# Patient Record
Sex: FEMALE | Race: BLACK OR AFRICAN AMERICAN | Employment: UNEMPLOYED | URBAN - METROPOLITAN AREA
[De-identification: names, ages, dates, MRNs, and addresses within clinical notes are randomized per-mention and may not be internally consistent; named-entity substitution may affect disease eponyms.]

---

## 2018-03-14 ENCOUNTER — TELEPHONE (OUTPATIENT)
Dept: FAMILY MEDICINE CLINIC | Facility: CLINIC | Age: 33
End: 2018-03-14

## 2018-03-14 NOTE — TELEPHONE ENCOUNTER
PT WOULD LIKE TO SEE IF DR HERNANDEZ WOULD ACCEPT PT AS NEW PT.    PT SEE'S DAUGHTER MARYANA DOWLING.     PT HAS MEDICAID INS-LOOKING TO ESTABLISH CARE    ADV  OUT OF OFFICE UNTIL TOMORROW    THANK YOU

## 2018-03-15 NOTE — TELEPHONE ENCOUNTER
Spoke to mom about making appt with KE, she seemed agreeable.  Came in with daughter and she said she will make appt when she leaves

## 2018-03-15 NOTE — TELEPHONE ENCOUNTER
Yes I would take her since I see her dtr, but please still offer her Dr. Rufus Gold or Dr. Ly Clubs, let her know my practice is so full it can be a wait to get in sometimes. ..

## 2019-04-09 ENCOUNTER — OFFICE VISIT (OUTPATIENT)
Dept: FAMILY MEDICINE CLINIC | Facility: CLINIC | Age: 34
End: 2019-04-09
Payer: MEDICAID

## 2019-04-09 VITALS
HEART RATE: 76 BPM | RESPIRATION RATE: 12 BRPM | DIASTOLIC BLOOD PRESSURE: 80 MMHG | TEMPERATURE: 99 F | BODY MASS INDEX: 25.03 KG/M2 | SYSTOLIC BLOOD PRESSURE: 120 MMHG | WEIGHT: 136 LBS | HEIGHT: 62 IN

## 2019-04-09 DIAGNOSIS — Z12.39 SCREENING BREAST EXAMINATION: ICD-10-CM

## 2019-04-09 DIAGNOSIS — R55 VASOVAGAL SYNCOPE: ICD-10-CM

## 2019-04-09 DIAGNOSIS — Z23 NEED FOR VACCINATION: ICD-10-CM

## 2019-04-09 DIAGNOSIS — R10.2 PELVIC PAIN: ICD-10-CM

## 2019-04-09 DIAGNOSIS — K42.9 UMBILICAL HERNIA WITHOUT OBSTRUCTION AND WITHOUT GANGRENE: ICD-10-CM

## 2019-04-09 DIAGNOSIS — M54.50 ACUTE LEFT-SIDED LOW BACK PAIN WITHOUT SCIATICA: ICD-10-CM

## 2019-04-09 DIAGNOSIS — Z12.4 CERVICAL CANCER SCREENING: ICD-10-CM

## 2019-04-09 DIAGNOSIS — Z00.00 ROUTINE HISTORY AND PHYSICAL EXAMINATION OF ADULT: Primary | ICD-10-CM

## 2019-04-09 DIAGNOSIS — Z87.42 HISTORY OF ABNORMAL CERVICAL PAP SMEAR: ICD-10-CM

## 2019-04-09 DIAGNOSIS — Z11.3 ROUTINE SCREENING FOR STI (SEXUALLY TRANSMITTED INFECTION): ICD-10-CM

## 2019-04-09 PROCEDURE — 87491 CHLMYD TRACH DNA AMP PROBE: CPT | Performed by: FAMILY MEDICINE

## 2019-04-09 PROCEDURE — 87624 HPV HI-RISK TYP POOLED RSLT: CPT | Performed by: FAMILY MEDICINE

## 2019-04-09 PROCEDURE — 88175 CYTOPATH C/V AUTO FLUID REDO: CPT | Performed by: FAMILY MEDICINE

## 2019-04-09 PROCEDURE — 90686 IIV4 VACC NO PRSV 0.5 ML IM: CPT | Performed by: FAMILY MEDICINE

## 2019-04-09 PROCEDURE — 87625 HPV TYPES 16 & 18 ONLY: CPT | Performed by: FAMILY MEDICINE

## 2019-04-09 PROCEDURE — 87591 N.GONORRHOEAE DNA AMP PROB: CPT | Performed by: FAMILY MEDICINE

## 2019-04-09 PROCEDURE — 90471 IMMUNIZATION ADMIN: CPT | Performed by: FAMILY MEDICINE

## 2019-04-09 PROCEDURE — 99385 PREV VISIT NEW AGE 18-39: CPT | Performed by: FAMILY MEDICINE

## 2019-04-15 ENCOUNTER — TELEPHONE (OUTPATIENT)
Dept: FAMILY MEDICINE CLINIC | Facility: CLINIC | Age: 34
End: 2019-04-15

## 2019-04-15 NOTE — TELEPHONE ENCOUNTER
----- Message from Nolan Walsh MD sent at 4/14/2019  8:54 PM CDT -----  Please notify patient PAP is mildly abnormal, the mildest of abnormalities, called ASCUS. It is due to a very common infection we call HPV.     HPV is very very common, up to 80% of

## 2019-04-18 NOTE — TELEPHONE ENCOUNTER
Patient stopped by office because she doesn't have time to talk on the phone to our office. Read patient results from Dr Rosemarie Staley. Patient verbalized understanding.    Advised patient that if she wants to schedule colposcopy [facility]

## 2019-04-29 ENCOUNTER — HOSPITAL ENCOUNTER (OUTPATIENT)
Dept: ULTRASOUND IMAGING | Age: 34
Discharge: HOME | End: 2019-04-29
Attending: FAMILY MEDICINE
Payer: MEDICAID

## 2019-04-29 DIAGNOSIS — R10.2 PELVIC PAIN: ICD-10-CM

## 2019-04-29 PROCEDURE — 76856 US EXAM PELVIC COMPLETE: CPT | Performed by: FAMILY MEDICINE

## 2019-04-29 PROCEDURE — 76830 TRANSVAGINAL US NON-OB: CPT | Performed by: FAMILY MEDICINE

## 2019-04-30 ENCOUNTER — TELEPHONE (OUTPATIENT)
Dept: FAMILY MEDICINE CLINIC | Facility: CLINIC | Age: 34
End: 2019-04-30

## 2019-04-30 NOTE — TELEPHONE ENCOUNTER
Patient advised. Verbalizes understanding. [id number] staff to call patient back to schedule with Dr. Ziggy Irwin.

## 2019-04-30 NOTE — TELEPHONE ENCOUNTER
----- Message from Marta Singh MD sent at 4/30/2019 11:52 AM CDT -----  Please notify patient of great news, pelvic u/s completely normal, no abnormalities of uterus nor ovaries.   I rec she f/u with Dr. England nine [phone] to see if he sees any sign

## 2019-06-06 ENCOUNTER — TELEPHONE (OUTPATIENT)
Dept: FAMILY MEDICINE CLINIC | Facility: CLINIC | Age: 34
End: 2019-06-06

## 2019-06-06 NOTE — TELEPHONE ENCOUNTER
PT STOPPED IN TODAY AND ADV THAT SHE WOULD LIKE A DIABETIC BLOOD TEST DONE. SHE ADV THAT SHE PASSES OUT. PT HAS A FUTURE APT ON 6/19 AND WOULD LIKE TO GET BLOOD TEST DONE ON THAT DAY. WOULD THANK BE OK?     Onelia Ortiz

## 2019-06-06 NOTE — TELEPHONE ENCOUNTER
Need more information. How often does this happen. Any triggering factors. ..stress, exercise, fasting?   May need ER evaluation    Left message on voicemail/answering machine for patient to call office

## 2019-06-07 NOTE — TELEPHONE ENCOUNTER
So these symptoms last occurred a little while ago, can wait until 6/19 to do testing (labs, possibly cardiac testing)

## 2019-06-07 NOTE — TELEPHONE ENCOUNTER
Spoke with the pt       Happened 3 times in her life- can not pinpoint what could be causing it    1st time she was in her  early 18's    2nd time was last year and 3rd time was early this year    3rd time was when she was at work- early this year    The s

## 2019-06-07 NOTE — TELEPHONE ENCOUNTER
Left message for the pt ok to wait til OV for testing- advised that if she has any episodes before then to please call the office

## 2019-06-07 NOTE — TELEPHONE ENCOUNTER
Message   Received:  Today   Message Contents   RUTH Jiménez Nurse   Caller: PT (Today, 10:03 AM)             PT RETURNED CALL, PLEASE CALL WHEN AVAILABLE     THANK YOU     PT GOES TO WORK AT 1PM, [address] [employer]

## 2019-06-19 ENCOUNTER — OFFICE VISIT (OUTPATIENT)
Dept: FAMILY MEDICINE CLINIC | Facility: CLINIC | Age: 34
End: 2019-06-19
Payer: MEDICAID

## 2019-06-19 VITALS
WEIGHT: 147.38 LBS | TEMPERATURE: 98 F | BODY MASS INDEX: 27.12 KG/M2 | HEIGHT: 62 IN | DIASTOLIC BLOOD PRESSURE: 80 MMHG | OXYGEN SATURATION: 98 % | HEART RATE: 88 BPM | SYSTOLIC BLOOD PRESSURE: 114 MMHG | RESPIRATION RATE: 14 BRPM

## 2019-06-19 DIAGNOSIS — R87.810 ASCUS WITH POSITIVE HIGH RISK HPV CERVICAL: ICD-10-CM

## 2019-06-19 DIAGNOSIS — Z01.818 PRE-PROCEDURAL EXAMINATION: Primary | ICD-10-CM

## 2019-06-19 DIAGNOSIS — K62.89 RECTAL PAIN: ICD-10-CM

## 2019-06-19 DIAGNOSIS — R87.610 ASCUS WITH POSITIVE HIGH RISK HPV CERVICAL: ICD-10-CM

## 2019-06-19 PROCEDURE — 81025 URINE PREGNANCY TEST: CPT | Performed by: FAMILY MEDICINE

## 2019-06-19 PROCEDURE — 57456 ENDOCERV CURETTAGE W/SCOPE: CPT | Performed by: FAMILY MEDICINE

## 2019-06-19 PROCEDURE — 88305 TISSUE EXAM BY PATHOLOGIST: CPT | Performed by: FAMILY MEDICINE

## 2019-06-19 PROCEDURE — 99212 OFFICE O/P EST SF 10 MIN: CPT | Performed by: FAMILY MEDICINE

## 2019-06-19 NOTE — PROGRESS NOTES
Rubia Louise is a 28year old female. HPI:   Pt is here for a colposcopy.      Indication: ASCUS PAP, HR HPV + (highest types neg)  Prior colposcopy: yes, no surgery ever (last one several years ago during last pregnancy)  Pregnancy test: negative  Iodine reproduced with digital exam when pressing posteriorly; rec f/u with Dr. Rachel Bars for closer look, patient agreeable, took his business card  The patient indicates understanding of these issues and agrees to the plan.

## 2019-06-24 ENCOUNTER — TELEPHONE (OUTPATIENT)
Dept: FAMILY MEDICINE CLINIC | Facility: CLINIC | Age: 34
End: 2019-06-24

## 2019-06-24 NOTE — TELEPHONE ENCOUNTER
----- Message from Marta Singh MD sent at 6/24/2019  6:52 AM CDT -----  Please notify patient the pathologist said they could see enough cells to get an accurate reading on the biopsy.   Our options are to repeat the test now (colposcopy), or repeat PAP i

## 2019-06-25 NOTE — TELEPHONE ENCOUNTER
Left detailed message for the pt with the notes from Dr. Juan J Bolanos.  Asked her to call back to let me know what she would like to do- advised that I will either place a reminder for next year or schedule her to repeat the test.

## 2019-07-10 NOTE — TELEPHONE ENCOUNTER
Left message for the pt to call back, I need to know what she would like to do- wait til next year or repeat the colpo

## 2019-07-16 NOTE — TELEPHONE ENCOUNTER
Mailed a letter to the pt    Recall entered for 1 year so that she has a reminder if she doesn't return for the repeat colpo

## 2019-07-25 ENCOUNTER — MED REC SCAN ONLY (OUTPATIENT)
Dept: FAMILY MEDICINE CLINIC | Facility: CLINIC | Age: 34
End: 2019-07-25

## 2019-08-21 NOTE — TELEPHONE ENCOUNTER
Was on the phone with the spouse and he passed the phone to her- I explained to her about the colpo and offered her to repeat pap in a year or repeat the colpo- she was to redo the colpo- scheduled an appt  Future Appointments   Date Time Provider Departme

## 2019-09-04 ENCOUNTER — HOSPITAL ENCOUNTER (OUTPATIENT)
Age: 34
Discharge: HOME | End: 2019-09-04
Attending: FAMILY MEDICINE
Payer: MEDICAID

## 2019-09-04 VITALS
DIASTOLIC BLOOD PRESSURE: 80 MMHG | TEMPERATURE: 99 F | SYSTOLIC BLOOD PRESSURE: 130 MMHG | HEART RATE: 70 BPM | OXYGEN SATURATION: 98 % | RESPIRATION RATE: 16 BRPM

## 2019-09-04 DIAGNOSIS — R05.9 COUGH: Primary | ICD-10-CM

## 2019-09-04 PROCEDURE — 99214 OFFICE O/P EST MOD 30 MIN: CPT

## 2019-09-04 PROCEDURE — 99213 OFFICE O/P EST LOW 20 MIN: CPT

## 2019-09-04 RX ORDER — BENZONATATE 200 MG/1
200 CAPSULE ORAL 3 TIMES DAILY PRN
Qty: 15 CAPSULE | Refills: 0 | Status: SHIPPED | OUTPATIENT
Start: 2019-09-04 | End: 2020-09-21 | Stop reason: ALTCHOICE

## 2019-09-04 RX ORDER — PREDNISONE 20 MG/1
40 TABLET ORAL DAILY
Qty: 10 TABLET | Refills: 0 | Status: SHIPPED | OUTPATIENT
Start: 2019-09-04 | End: 2019-09-09

## 2019-09-04 RX ORDER — ALBUTEROL SULFATE 90 UG/1
2 AEROSOL, METERED RESPIRATORY (INHALATION) EVERY 6 HOURS PRN
Qty: 1 INHALER | Refills: 0 | Status: SHIPPED | OUTPATIENT
Start: 2019-09-04 | End: 2019-10-04

## 2019-09-04 NOTE — ED INITIAL ASSESSMENT (HPI)
Julianna Donaldume a house yesterday, cough started when she walked into the house, she was also painting the house, cough increased overnight, no fevers.

## 2019-09-04 NOTE — ED PROVIDER NOTES
Patient Seen in: [address] [facility]    History   Patient presents with:  Cough/URI    Stated Complaint: cough/chest congestion/exposed to mold    HPI    **25-year-old female presents to the immediate care today with a chief complaint of nonp normal  Neck: no adenopathy, supple, no bruits  Lungs: Decreased air entry bilaterally. . No rhonchi, wheezing  or crackles. No accessory muscle use. No respiratory distress. No tachypnea noted. No retractions noted.    Heart: S1, S2 normal, no murmur, clic

## 2019-09-16 ENCOUNTER — MED REC SCAN ONLY (OUTPATIENT)
Dept: FAMILY MEDICINE CLINIC | Facility: CLINIC | Age: 34
End: 2019-09-16

## 2020-09-21 ENCOUNTER — OFFICE VISIT (OUTPATIENT)
Dept: FAMILY MEDICINE CLINIC | Facility: CLINIC | Age: 35
End: 2020-09-21
Payer: MEDICAID

## 2020-09-21 VITALS
TEMPERATURE: 98 F | RESPIRATION RATE: 20 BRPM | WEIGHT: 145 LBS | OXYGEN SATURATION: 99 % | DIASTOLIC BLOOD PRESSURE: 58 MMHG | HEART RATE: 84 BPM | HEIGHT: 62 IN | SYSTOLIC BLOOD PRESSURE: 104 MMHG | BODY MASS INDEX: 26.68 KG/M2

## 2020-09-21 DIAGNOSIS — M54.2 NECK PAIN: ICD-10-CM
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
## 2020-09-21 DIAGNOSIS — Z23 NEED FOR VACCINATION: ICD-10-CM

## 2020-09-21 DIAGNOSIS — Z09 FOLLOW-UP EXAM: Primary | ICD-10-CM

## 2020-09-21 DIAGNOSIS — R51.9 PERSISTENT HEADACHES: ICD-10-CM

## 2020-09-21 PROCEDURE — 90686 IIV4 VACC NO PRSV 0.5 ML IM: CPT | Performed by: FAMILY MEDICINE

## 2020-09-21 PROCEDURE — 90471 IMMUNIZATION ADMIN: CPT | Performed by: FAMILY MEDICINE

## 2020-09-21 PROCEDURE — 3074F SYST BP LT 130 MM HG: CPT | Performed by: FAMILY MEDICINE

## 2020-09-21 PROCEDURE — 99214 OFFICE O/P EST MOD 30 MIN: CPT | Performed by: FAMILY MEDICINE

## 2020-09-21 PROCEDURE — 3078F DIAST BP <80 MM HG: CPT | Performed by: FAMILY MEDICINE

## 2020-09-21 PROCEDURE — 3008F BODY MASS INDEX DOCD: CPT | Performed by: FAMILY MEDICINE
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
## 2020-09-29 ENCOUNTER — OFFICE VISIT (OUTPATIENT)
Dept: FAMILY MEDICINE CLINIC | Facility: CLINIC | Age: 35
End: 2020-09-29
Payer: MEDICAID

## 2020-09-29 ENCOUNTER — LABORATORY ENCOUNTER (OUTPATIENT)
Dept: LAB | Age: 35
End: 2020-09-29
Attending: FAMILY MEDICINE
Payer: MEDICAID

## 2020-09-29 VITALS
TEMPERATURE: 97 F | RESPIRATION RATE: 18 BRPM | OXYGEN SATURATION: 94 % | WEIGHT: 143 LBS | SYSTOLIC BLOOD PRESSURE: 128 MMHG | BODY MASS INDEX: 25.66 KG/M2 | HEIGHT: 62.5 IN | HEART RATE: 82 BPM | DIASTOLIC BLOOD PRESSURE: 84 MMHG

## 2020-09-29 DIAGNOSIS — M54.2 NECK PAIN: ICD-10-CM

## 2020-09-29 DIAGNOSIS — Z13.29 THYROID DISORDER SCREEN: ICD-10-CM

## 2020-09-29 DIAGNOSIS — Z13.220 LIPID SCREENING: ICD-10-CM

## 2020-09-29 DIAGNOSIS — M62.838 NECK MUSCLE SPASM: ICD-10-CM

## 2020-09-29 DIAGNOSIS — Z13.1 DIABETES MELLITUS SCREENING: ICD-10-CM

## 2020-09-29 DIAGNOSIS — Z11.3 ROUTINE SCREENING FOR STI (SEXUALLY TRANSMITTED INFECTION): ICD-10-CM

## 2020-09-29 DIAGNOSIS — Z12.4 CERVICAL CANCER SCREENING: Primary | ICD-10-CM

## 2020-09-29 DIAGNOSIS — Z13.0 SCREENING, ANEMIA, DEFICIENCY, IRON: ICD-10-CM

## 2020-09-29 PROCEDURE — 87591 N.GONORRHOEAE DNA AMP PROB: CPT | Performed by: FAMILY MEDICINE

## 2020-09-29 PROCEDURE — 87625 HPV TYPES 16 & 18 ONLY: CPT | Performed by: FAMILY MEDICINE

## 2020-09-29 PROCEDURE — 3079F DIAST BP 80-89 MM HG: CPT | Performed by: FAMILY MEDICINE

## 2020-09-29 PROCEDURE — 3074F SYST BP LT 130 MM HG: CPT | Performed by: FAMILY MEDICINE

## 2020-09-29 PROCEDURE — 80053 COMPREHEN METABOLIC PANEL: CPT

## 2020-09-29 PROCEDURE — 87491 CHLMYD TRACH DNA AMP PROBE: CPT | Performed by: FAMILY MEDICINE

## 2020-09-29 PROCEDURE — 87389 HIV-1 AG W/HIV-1&-2 AB AG IA: CPT

## 2020-09-29 PROCEDURE — 3008F BODY MASS INDEX DOCD: CPT | Performed by: FAMILY MEDICINE

## 2020-09-29 PROCEDURE — 88175 CYTOPATH C/V AUTO FLUID REDO: CPT | Performed by: FAMILY MEDICINE

## 2020-09-29 PROCEDURE — 83036 HEMOGLOBIN GLYCOSYLATED A1C: CPT

## 2020-09-29 PROCEDURE — 86780 TREPONEMA PALLIDUM: CPT

## 2020-09-29 PROCEDURE — 87624 HPV HI-RISK TYP POOLED RSLT: CPT | Performed by: FAMILY MEDICINE

## 2020-09-29 PROCEDURE — 80061 LIPID PANEL: CPT

## 2020-09-29 PROCEDURE — 86803 HEPATITIS C AB TEST: CPT

## 2020-09-29 PROCEDURE — 36415 COLL VENOUS BLD VENIPUNCTURE: CPT

## 2020-09-29 PROCEDURE — 99395 PREV VISIT EST AGE 18-39: CPT | Performed by: FAMILY MEDICINE

## 2020-09-29 PROCEDURE — 84443 ASSAY THYROID STIM HORMONE: CPT

## 2020-09-29 PROCEDURE — 85025 COMPLETE CBC W/AUTO DIFF WBC: CPT

## 2020-09-29 RX ORDER — CYCLOBENZAPRINE HCL 10 MG
10 TABLET ORAL 2 TIMES DAILY
COMMUNITY
Start: 2020-09-21 | End: 2020-09-29

## 2020-09-29 RX ORDER — CYCLOBENZAPRINE HCL 10 MG
10 TABLET ORAL 2 TIMES DAILY PRN
Qty: 28 TABLET | Refills: 0 | Status: SHIPPED | OUTPATIENT
Start: 2020-09-29 | End: 2020-10-13

## 2020-09-29 NOTE — PROGRESS NOTES
HPI:   Ansley Cintron is a 27year old female who presents for her PAP to f/u on prior ascus. She has no gyne concners. Regular periods once a month, not too heavy, last a week. She's interested in general checkup blood work and STI screen.       She is f nourished,in no apparent distress  SKIN: no rashes,no suspicious lesions  MUSC: + significant muscle spasm and tenderness of R paracervical, upper back and SCM region compared to left  LUNGS: clear to auscultation  CARDIO: RRR without murmur  :introitus Future  -     HCV ANTIBODY; Future  -     HIV AG AB COMBO; Future  -     T PALLIDUM SCREENING CASCADE; Future  -     CBC WITH DIFFERENTIAL WITH PLATELET; Future  -     COMP METABOLIC PANEL (14); Future  -     HEMOGLOBIN A1C; Future  -     LIPID PANEL;  Futu

## 2020-09-30 ENCOUNTER — APPOINTMENT (OUTPATIENT)
Dept: GENERAL RADIOLOGY | Facility: HOSPITAL | Age: 35
End: 2020-09-30
Attending: PHYSICIAN ASSISTANT
Payer: MEDICAID

## 2020-09-30 ENCOUNTER — HOSPITAL ENCOUNTER (EMERGENCY)
Facility: HOSPITAL | Age: 35
Discharge: HOME | End: 2020-09-30
Attending: PHYSICIAN ASSISTANT
Payer: MEDICAID

## 2020-09-30 VITALS
TEMPERATURE: 98 F | BODY MASS INDEX: 26.68 KG/M2 | RESPIRATION RATE: 18 BRPM | OXYGEN SATURATION: 98 % | DIASTOLIC BLOOD PRESSURE: 59 MMHG | SYSTOLIC BLOOD PRESSURE: 102 MMHG | HEIGHT: 62 IN | HEART RATE: 74 BPM | WEIGHT: 145 LBS

## 2020-09-30 DIAGNOSIS — S86.911A KNEE STRAIN, RIGHT, INITIAL ENCOUNTER: Primary | ICD-10-CM

## 2020-09-30 LAB
C TRACH DNA SPEC QL NAA+PROBE: NEGATIVE
N GONORRHOEA DNA SPEC QL NAA+PROBE: NEGATIVE

## 2020-09-30 PROCEDURE — 99284 EMERGENCY DEPT VISIT MOD MDM: CPT

## 2020-09-30 PROCEDURE — 73560 X-RAY EXAM OF KNEE 1 OR 2: CPT | Performed by: PHYSICIAN ASSISTANT

## 2020-09-30 RX ORDER — IBUPROFEN 600 MG/1
TABLET ORAL
Qty: 20 TABLET | Refills: 0 | Status: SHIPPED | OUTPATIENT
Start: 2020-09-30 | End: 2021-08-13

## 2020-09-30 RX ORDER — HYDROCODONE BITARTRATE AND ACETAMINOPHEN 5; 325 MG/1; MG/1
1 TABLET ORAL ONCE
Status: COMPLETED | OUTPATIENT
Start: 2020-09-30 | End: 2020-09-30

## 2020-09-30 RX ORDER — HYDROCODONE BITARTRATE AND ACETAMINOPHEN 5; 325 MG/1; MG/1
1 TABLET ORAL EVERY 6 HOURS PRN
Qty: 8 TABLET | Refills: 0 | Status: SHIPPED | OUTPATIENT
Start: 2020-09-30 | End: 2020-10-07

## 2020-09-30 RX ORDER — IBUPROFEN 600 MG/1
600 TABLET ORAL ONCE
Status: COMPLETED | OUTPATIENT
Start: 2020-09-30 | End: 2020-09-30

## 2020-10-01 NOTE — ED PROVIDER NOTES
Patient Seen in: [facility] Emergency Department    History   Patient presents with:  Fall    Stated Complaint: fall    HPI    HPI: Kaylen Lozano is a 27year old female who presents with chief complaint of right knee pain. Onset yesterday.   Marleny Resp 18   Temp 98.3 °F (36.8 °C)   Temp src Oral   SpO2 97 %   O2 Device None (Room air)       Current:/59   Pulse 74   Temp 98.3 °F (36.8 °C) (Oral)   Resp 18   Ht 157.5 cm (5' 2\")   Wt 65.8 kg   LMP 09/15/2020   SpO2 98%   BMI 26.52 kg/m² obvious rash.     Differential diagnosis to include fracture vs. Strain/sprain vs. contusion      ED Course   Labs Reviewed - No data to display    MDM     Radiology findings: Xr Knee (1 Or 2 Views), Right (cpt=73560)    Result Date: 9/30/2020  CONCLUSION:

## 2020-10-05 ENCOUNTER — TELEPHONE (OUTPATIENT)
Dept: FAMILY MEDICINE CLINIC | Facility: CLINIC | Age: 35
End: 2020-10-05

## 2020-10-05 DIAGNOSIS — D72.819 LEUKOPENIA, UNSPECIFIED TYPE: Primary | ICD-10-CM

## 2020-10-05 NOTE — TELEPHONE ENCOUNTER
----- Message from Che Fernandez MD sent at 10/4/2020  7:54 PM CDT -----  Please notify labs look great for most part: neg STI (hiv, syphillis, hep C). Normal kidneys, liver, electrolytes, thyroid, cholesterol.   Only mild abnormality is white blood cell c

## 2020-10-14 ENCOUNTER — TELEPHONE (OUTPATIENT)
Dept: OBGYN CLINIC | Facility: CLINIC | Age: 35
End: 2020-10-14

## 2020-10-14 ENCOUNTER — TELEPHONE (OUTPATIENT)
Dept: FAMILY MEDICINE CLINIC | Facility: CLINIC | Age: 35
End: 2020-10-14

## 2020-10-14 NOTE — TELEPHONE ENCOUNTER
PT CALLED SHE WAS UNABLE TO MAKE AN APPOINTMENT WITH UROLOGY AS THE NEED THE REFERRAL FAXED TO THEM AND THEN THEY CALL HER.  SHE STATES THE SHE IS GOING TO [place]

## 2020-10-14 NOTE — TELEPHONE ENCOUNTER
Tried to call the pt to find out who she is going to see and why so I can place a referral and fax it.  No answer and mailbox is full - pt will need a call back

## 2020-10-14 NOTE — TELEPHONE ENCOUNTER
Pt was referred by PCP for abnormal Pap. Result in system. Please review and determine how to schedule appt.

## 2020-10-21 NOTE — TELEPHONE ENCOUNTER
Spoke with the pt and asked about urology and she states that this is for her spouse Kaushal Kidney- updated his chart    She states that she was not able to make the appt with OB- they were looking for additional information    Advised the pt that I see in her c

## 2020-10-21 NOTE — TELEPHONE ENCOUNTER
Patient returned call. Informed of recommendations. She has had colpo in the past.  Advised of no intercourse 24 hours before and can take Ibuprofen 600 MG 30-60 minutes before the procedure. Call transferred to [facility] to schedule.

## 2020-11-19 ENCOUNTER — TELEPHONE (OUTPATIENT)
Dept: FAMILY MEDICINE CLINIC | Facility: CLINIC | Age: 35
End: 2020-11-19

## 2020-11-19 NOTE — TELEPHONE ENCOUNTER
Letter mailed to patient reminding her she is due for labs.     Lab Frequency Next Occurrence   CBC WITH DIFFERENTIAL WITH PLATELET Once 07/44/3658

## 2020-11-24 ENCOUNTER — OFFICE VISIT (OUTPATIENT)
Dept: OBGYN CLINIC | Facility: CLINIC | Age: 35
End: 2020-11-24
Payer: MEDICAID

## 2020-11-24 VITALS
BODY MASS INDEX: 27.56 KG/M2 | HEIGHT: 61 IN | DIASTOLIC BLOOD PRESSURE: 74 MMHG | SYSTOLIC BLOOD PRESSURE: 120 MMHG | WEIGHT: 146 LBS

## 2020-11-24 DIAGNOSIS — R87.611 PAP SMEAR OF CERVIX WITH ASCUS, CANNOT EXCLUDE HGSIL: ICD-10-CM

## 2020-11-24 DIAGNOSIS — Z32.02 PREGNANCY EXAMINATION OR TEST, NEGATIVE RESULT: Primary | ICD-10-CM

## 2020-11-24 PROCEDURE — 3078F DIAST BP <80 MM HG: CPT | Performed by: OBSTETRICS & GYNECOLOGY

## 2020-11-24 PROCEDURE — 88342 IMHCHEM/IMCYTCHM 1ST ANTB: CPT | Performed by: OBSTETRICS & GYNECOLOGY

## 2020-11-24 PROCEDURE — 81025 URINE PREGNANCY TEST: CPT | Performed by: OBSTETRICS & GYNECOLOGY

## 2020-11-24 PROCEDURE — 3008F BODY MASS INDEX DOCD: CPT | Performed by: OBSTETRICS & GYNECOLOGY

## 2020-11-24 PROCEDURE — 57454 BX/CURETT OF CERVIX W/SCOPE: CPT | Performed by: OBSTETRICS & GYNECOLOGY

## 2020-11-24 PROCEDURE — 88305 TISSUE EXAM BY PATHOLOGIST: CPT | Performed by: OBSTETRICS & GYNECOLOGY

## 2020-11-24 PROCEDURE — 3074F SYST BP LT 130 MM HG: CPT | Performed by: OBSTETRICS & GYNECOLOGY

## 2020-11-24 NOTE — PATIENT INSTRUCTIONS
[facility] Department of OB/GYN  After Care Instructions for Colposcopy/Biopsy      Biopsy Results   You will receive a phone call with your biopsy results in 7 business days. If you have not received your biopsy results in 7 days, please contact our office.   Geoff hSort
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
## 2020-12-03 ENCOUNTER — TELEPHONE (OUTPATIENT)
Dept: OBGYN CLINIC | Facility: CLINIC | Age: 35
End: 2020-12-03

## 2020-12-07 ENCOUNTER — OFFICE VISIT (OUTPATIENT)
Dept: OBGYN CLINIC | Facility: CLINIC | Age: 35
End: 2020-12-07
Payer: MEDICAID

## 2020-12-07 VITALS
DIASTOLIC BLOOD PRESSURE: 62 MMHG | SYSTOLIC BLOOD PRESSURE: 104 MMHG | BODY MASS INDEX: 27.38 KG/M2 | WEIGHT: 145 LBS | HEIGHT: 61 IN

## 2020-12-07 DIAGNOSIS — N87.9 DYSPLASIA OF CERVIX: Primary | ICD-10-CM

## 2020-12-07 PROCEDURE — 3074F SYST BP LT 130 MM HG: CPT | Performed by: OBSTETRICS & GYNECOLOGY

## 2020-12-07 PROCEDURE — 3078F DIAST BP <80 MM HG: CPT | Performed by: OBSTETRICS & GYNECOLOGY

## 2020-12-07 PROCEDURE — 3008F BODY MASS INDEX DOCD: CPT | Performed by: OBSTETRICS & GYNECOLOGY

## 2020-12-07 PROCEDURE — 99024 POSTOP FOLLOW-UP VISIT: CPT | Performed by: OBSTETRICS & GYNECOLOGY

## 2020-12-07 NOTE — PROGRESS NOTES
Post Op Colposcopy    No C/O  ROS: No Cardiac, Respiratory, GI,  or Neurological symptoms.     Slight discharge    PE:  Abdomen soft  Cervix well healed    Discussed LUIS 2, positive ECC and ectocervix    Treatment cryo, LEEP, cone biopsy discussed    She

## 2021-01-08 ENCOUNTER — TELEPHONE (OUTPATIENT)
Dept: OBGYN CLINIC | Facility: CLINIC | Age: 36
End: 2021-01-08

## 2021-01-26 ENCOUNTER — TELEPHONE (OUTPATIENT)
Dept: FAMILY MEDICINE CLINIC | Facility: CLINIC | Age: 36
End: 2021-01-26

## 2021-01-26 NOTE — TELEPHONE ENCOUNTER
Signed release of records received from Dr. Alireza Mueller's office OB/Gyn for pt to send pap and cultures from 2020      Faxed to [phone]

## 2021-02-02 NOTE — TELEPHONE ENCOUNTER
Surgery scheduled for 4/6/2021 at 7:30AM  Post op   Future Appointments   Date Time Provider Tania Owens   4/23/2021 10:30 AM Arely Pacheco MD [facility] [address]     Orders faxed to OR  Added to calendar  PA - no auth needed for CPT 92955  Ref

## 2021-04-02 ENCOUNTER — MED REC SCAN ONLY (OUTPATIENT)
Dept: FAMILY MEDICINE CLINIC | Facility: CLINIC | Age: 36
End: 2021-04-02

## 2021-04-03 ENCOUNTER — LAB ENCOUNTER (OUTPATIENT)
Dept: LAB | Age: 36
End: 2021-04-03
Attending: OBSTETRICS & GYNECOLOGY
Payer: MEDICAID

## 2021-04-03 DIAGNOSIS — N87.9 CERVICAL DYSPLASIA: ICD-10-CM

## 2021-04-05 ENCOUNTER — ANESTHESIA EVENT (OUTPATIENT)
Dept: SURGERY | Facility: HOSPITAL | Age: 36
End: 2021-04-05
Payer: MEDICAID

## 2021-04-06 ENCOUNTER — HOSPITAL ENCOUNTER (OUTPATIENT)
Facility: HOSPITAL | Age: 36
Setting detail: HOSPITAL OUTPATIENT SURGERY
Discharge: HOME | End: 2021-04-06
Attending: OBSTETRICS & GYNECOLOGY | Admitting: OBSTETRICS & GYNECOLOGY
Payer: MEDICAID

## 2021-04-06 ENCOUNTER — ANESTHESIA (OUTPATIENT)
Dept: SURGERY | Facility: HOSPITAL | Age: 36
End: 2021-04-06
Payer: MEDICAID

## 2021-04-06 VITALS
HEART RATE: 56 BPM | OXYGEN SATURATION: 97 % | BODY MASS INDEX: 28.32 KG/M2 | RESPIRATION RATE: 18 BRPM | WEIGHT: 150 LBS | TEMPERATURE: 97 F | HEIGHT: 61 IN | DIASTOLIC BLOOD PRESSURE: 73 MMHG | SYSTOLIC BLOOD PRESSURE: 112 MMHG

## 2021-04-06 DIAGNOSIS — N87.9 CERVICAL DYSPLASIA: Primary | ICD-10-CM

## 2021-04-06 PROCEDURE — 0UBC7ZX EXCISION OF CERVIX, VIA NATURAL OR ARTIFICIAL OPENING, DIAGNOSTIC: ICD-10-PCS | Performed by: OBSTETRICS & GYNECOLOGY

## 2021-04-06 PROCEDURE — 57520 CONIZATION OF CERVIX: CPT | Performed by: OBSTETRICS & GYNECOLOGY

## 2021-04-06 RX ORDER — ONDANSETRON 2 MG/ML
INJECTION INTRAMUSCULAR; INTRAVENOUS AS NEEDED
Status: DISCONTINUED | OUTPATIENT
Start: 2021-04-06 | End: 2021-04-06 | Stop reason: SURG

## 2021-04-06 RX ORDER — IODINE SOLUTION STRONG 5% (LUGOL'S) 5 %
SOLUTION ORAL AS NEEDED
Status: DISCONTINUED | OUTPATIENT
Start: 2021-04-06 | End: 2021-04-06 | Stop reason: HOSPADM

## 2021-04-06 RX ORDER — DEXAMETHASONE SODIUM PHOSPHATE 4 MG/ML
VIAL (ML) INJECTION AS NEEDED
Status: DISCONTINUED | OUTPATIENT
Start: 2021-04-06 | End: 2021-04-06 | Stop reason: SURG

## 2021-04-06 RX ORDER — LIDOCAINE HYDROCHLORIDE 10 MG/ML
INJECTION, SOLUTION EPIDURAL; INFILTRATION; INTRACAUDAL; PERINEURAL AS NEEDED
Status: DISCONTINUED | OUTPATIENT
Start: 2021-04-06 | End: 2021-04-06 | Stop reason: SURG

## 2021-04-06 RX ORDER — HYDROCODONE BITARTRATE AND ACETAMINOPHEN 5; 325 MG/1; MG/1
2 TABLET ORAL AS NEEDED
Status: DISCONTINUED | OUTPATIENT
Start: 2021-04-06 | End: 2021-04-06

## 2021-04-06 RX ORDER — SODIUM CHLORIDE, SODIUM LACTATE, POTASSIUM CHLORIDE, CALCIUM CHLORIDE 600; 310; 30; 20 MG/100ML; MG/100ML; MG/100ML; MG/100ML
INJECTION, SOLUTION INTRAVENOUS CONTINUOUS
Status: DISCONTINUED | OUTPATIENT
Start: 2021-04-06 | End: 2021-04-06

## 2021-04-06 RX ORDER — KETOROLAC TROMETHAMINE 30 MG/ML
INJECTION, SOLUTION INTRAMUSCULAR; INTRAVENOUS AS NEEDED
Status: DISCONTINUED | OUTPATIENT
Start: 2021-04-06 | End: 2021-04-06 | Stop reason: SURG

## 2021-04-06 RX ORDER — ACETAMINOPHEN 500 MG
1000 TABLET ORAL EVERY 6 HOURS PRN
COMMUNITY
End: 2021-04-12 | Stop reason: ALTCHOICE

## 2021-04-06 RX ORDER — FERRIC SUBSULFATE 20-22G/100
SOLUTION, NON-ORAL MISCELLANEOUS AS NEEDED
Status: DISCONTINUED | OUTPATIENT
Start: 2021-04-06 | End: 2021-04-06 | Stop reason: HOSPADM

## 2021-04-06 RX ORDER — ACETAMINOPHEN 500 MG
1000 TABLET ORAL ONCE
Status: DISCONTINUED | OUTPATIENT
Start: 2021-04-06 | End: 2021-04-06 | Stop reason: HOSPADM

## 2021-04-06 RX ORDER — HYDROCODONE BITARTRATE AND ACETAMINOPHEN 5; 325 MG/1; MG/1
1 TABLET ORAL AS NEEDED
Status: DISCONTINUED | OUTPATIENT
Start: 2021-04-06 | End: 2021-04-06

## 2021-04-06 RX ORDER — NALOXONE HYDROCHLORIDE 0.4 MG/ML
80 INJECTION, SOLUTION INTRAMUSCULAR; INTRAVENOUS; SUBCUTANEOUS AS NEEDED
Status: DISCONTINUED | OUTPATIENT
Start: 2021-04-06 | End: 2021-04-06

## 2021-04-06 RX ORDER — ONDANSETRON 2 MG/ML
4 INJECTION INTRAMUSCULAR; INTRAVENOUS AS NEEDED
Status: DISCONTINUED | OUTPATIENT
Start: 2021-04-06 | End: 2021-04-06

## 2021-04-06 RX ADMIN — ONDANSETRON 4 MG: 2 INJECTION INTRAMUSCULAR; INTRAVENOUS at 08:07:00

## 2021-04-06 RX ADMIN — LIDOCAINE HYDROCHLORIDE 100 MG: 10 INJECTION, SOLUTION EPIDURAL; INFILTRATION; INTRACAUDAL; PERINEURAL at 07:35:00

## 2021-04-06 RX ADMIN — DEXAMETHASONE SODIUM PHOSPHATE 8 MG: 4 MG/ML VIAL (ML) INJECTION at 07:39:00

## 2021-04-06 RX ADMIN — SODIUM CHLORIDE, SODIUM LACTATE, POTASSIUM CHLORIDE, CALCIUM CHLORIDE: 600; 310; 30; 20 INJECTION, SOLUTION INTRAVENOUS at 08:17:00

## 2021-04-06 RX ADMIN — KETOROLAC TROMETHAMINE 30 MG: 30 INJECTION, SOLUTION INTRAMUSCULAR; INTRAVENOUS at 08:08:00

## 2021-04-06 NOTE — BRIEF OP NOTE
Pre-Operative Diagnosis: CERVICAL DYSPLASIA     Post-Operative Diagnosis: CERVICAL DYSPLASIA      Procedure Performed:   COLD KNIFE CONE BIOPSY CERVIX    Surgeon(s) and Role:     Brit Smith MD - Primary    Assistant(s):        Surgical Findings: Lugol

## 2021-04-06 NOTE — OPERATIVE REPORT
[facility]    PATIENT'S NAME: Pavel James   ATTENDING PHYSICIAN: Juan José Keyes M.D. OPERATING PHYSICIAN: Juan José Keyes M.D.    PATIENT ACCOUNT#:   [de-identified]    LOCATION:  PREOPASCC  PRE ASCC 13 EDWP 10  MEDICAL RECORD #:   [id number]       DATE M.D.  d: 04/06/2021 08:17:46  t: 04/06/2021 08:44:32  [facility] [id number]  TTC/

## 2021-04-06 NOTE — ANESTHESIA PROCEDURE NOTES
Airway  Date/Time: 4/6/2021 7:36 AM  Urgency: elective    Airway not difficult    General Information and Staff    Patient location during procedure: OR  Anesthesiologist: Romayne Fredericks, MD  Performed: anesthesiologist     Indications and Patient C

## 2021-04-06 NOTE — ANESTHESIA POSTPROCEDURE EVALUATION
[address] [address] Patient Status:  Hospital Outpatient Surgery   Age/Gender 38year old female MRN [id number]   [facility] SURGERY Attending Gallito Davis MD   [facility] Day # 0 PCP Gussie Holstein, MD       Anesthesia Post-op Note    C

## 2021-04-06 NOTE — H&P
Patient is 39 y/o , regular menses, LMP: 21  History of abnormal PAP . Was watching. No follow up since her last pregnancy .   Most recent PAP  through PCP ASCUS-H  Follow up colposcopy with biopsy LUIS II, positive ECC  Here for cone

## 2021-04-06 NOTE — ANESTHESIA PREPROCEDURE EVALUATION
PRE-OP EVALUATION    Patient Name: Jayla Delaney    Admit Diagnosis: CERVICAL DYSPLASIA    Pre-op Diagnosis: CERVICAL DYSPLASIA    COLD KNIFE CONE BIOPSY CERVIX    Anesthesia Procedure: COLD KNIFE CONE BIOPSY CERVIX (N/A Vagina )    Surgeon(s) and Role: full Cardiovascular    Cardiovascular exam normal.  Rhythm: regular  Rate: normal     Dental    No notable dental history. Pulmonary    Pulmonary exam normal.  Breath sounds clear to auscultation bilaterally.                Other findings

## 2021-04-12 ENCOUNTER — OFFICE VISIT (OUTPATIENT)
Dept: OBGYN CLINIC | Facility: CLINIC | Age: 36
End: 2021-04-12
Payer: MEDICAID

## 2021-04-12 VITALS
HEART RATE: 73 BPM | RESPIRATION RATE: 16 BRPM | BODY MASS INDEX: 27 KG/M2 | SYSTOLIC BLOOD PRESSURE: 94 MMHG | DIASTOLIC BLOOD PRESSURE: 60 MMHG | WEIGHT: 145 LBS

## 2021-04-12 DIAGNOSIS — N87.9 DYSPLASIA OF CERVIX: Primary | ICD-10-CM

## 2021-04-12 PROCEDURE — 99024 POSTOP FOLLOW-UP VISIT: CPT | Performed by: OBSTETRICS & GYNECOLOGY

## 2021-04-12 PROCEDURE — 3074F SYST BP LT 130 MM HG: CPT | Performed by: OBSTETRICS & GYNECOLOGY

## 2021-04-12 PROCEDURE — 3078F DIAST BP <80 MM HG: CPT | Performed by: OBSTETRICS & GYNECOLOGY

## 2021-04-12 NOTE — PROGRESS NOTES
Post Op  No C/O  Some dark discharge    ROS: No Cardiac, Respiratory, GI,  or Neurological symptoms.     PE:  Abdomen soft  Cx: healing, some discharge    A/P: Normal post op    Annual/PAP in 6 months

## 2021-04-15 NOTE — PROGRESS NOTES
HPI:   Yoanna Lux is a 28year old female who presents for a complete physical exam.      Patient complains of passing out at work recently, she thinks 2 months ago.   She recalls feeling hot, then felt dizzy and saw circles, then passed out, hit head on denies any unusual skin lesions  EYES:denies blurred vision or double vision  HEENT: denies nasal congestion, sinus pain or ST  LUNGS: denies shortness of breath with exertion, no chronic cough  CARDIOVASCULAR: denies chest pain on exertion, no heart palps THINPREP PAP SMEAR B  - HPV HIGH RISK , THIN PREP COLLECTION    2. Pelvic pain  ?  If it's from what appears to be an umbilical hernia (or at least gap between muscle, no protruding abd contents noted) vs uterine path vs ? ; check pelvic u/s, if neg will re Detail Level: Detailed Detail Level: Generalized

## 2021-07-09 ENCOUNTER — TELEPHONE (OUTPATIENT)
Dept: FAMILY MEDICINE CLINIC | Facility: CLINIC | Age: 36
End: 2021-07-09

## 2021-07-09 NOTE — TELEPHONE ENCOUNTER
Rolande Dancer, care coordinator from [place] called to inform that patient was admitted and discharge from [facility] behavioral [facility].    DATES    7/4/21-7/9/21    DX'S ALCOHOL ABUSE    If any information required please call Rolande Dancer, @ [phone]

## 2021-07-09 NOTE — TELEPHONE ENCOUNTER
Forward to Dr. Rosemarie Staley, anything further for this patient? No follow up appt with you on file.

## 2021-07-10 NOTE — TELEPHONE ENCOUNTER
Future Appointments   Date Time Provider Tania Owens   7/28/2021  8:45 AM Adilene Berrios MD [facility] [place]   10/19/2021 11:00 AM Shonda Angeles MD [facility] [place]

## 2021-07-16 ENCOUNTER — TELEPHONE (OUTPATIENT)
Dept: FAMILY MEDICINE CLINIC | Facility: CLINIC | Age: 36
End: 2021-07-16

## 2021-07-16 NOTE — TELEPHONE ENCOUNTER
Please clarify, I'm just a little confused why they want script from me. She is seeing a psychiatrist, correct?   Medication should come from and be managed by psychiatrist it sounds like

## 2021-07-16 NOTE — TELEPHONE ENCOUNTER
Mariola Dutta with [facility] called with a request. Dipika Alba was discharged from an inpatient behavioral health unit to [facility]. The hospital that discharged Dipika Alba did not send of the medication they want Charis on.       Mariola Dutta is wondering if Dr Lisbeth Eaton

## 2021-08-13 ENCOUNTER — OFFICE VISIT (OUTPATIENT)
Dept: FAMILY MEDICINE CLINIC | Facility: CLINIC | Age: 36
End: 2021-08-13
Payer: MEDICAID

## 2021-08-13 VITALS
SYSTOLIC BLOOD PRESSURE: 92 MMHG | RESPIRATION RATE: 16 BRPM | TEMPERATURE: 98 F | BODY MASS INDEX: 28.89 KG/M2 | HEART RATE: 84 BPM | DIASTOLIC BLOOD PRESSURE: 66 MMHG | WEIGHT: 153 LBS | HEIGHT: 61 IN

## 2021-08-13 DIAGNOSIS — Z09 HOSPITAL DISCHARGE FOLLOW-UP: Primary | ICD-10-CM

## 2021-08-13 DIAGNOSIS — F41.9 ANXIETY: ICD-10-CM

## 2021-08-13 DIAGNOSIS — F10.10 ALCOHOL ABUSE: ICD-10-CM

## 2021-08-13 PROCEDURE — 3074F SYST BP LT 130 MM HG: CPT | Performed by: FAMILY MEDICINE

## 2021-08-13 PROCEDURE — 3078F DIAST BP <80 MM HG: CPT | Performed by: FAMILY MEDICINE

## 2021-08-13 PROCEDURE — 3008F BODY MASS INDEX DOCD: CPT | Performed by: FAMILY MEDICINE

## 2021-08-13 PROCEDURE — 99215 OFFICE O/P EST HI 40 MIN: CPT | Performed by: FAMILY MEDICINE

## 2021-08-13 RX ORDER — SERTRALINE HYDROCHLORIDE 25 MG/1
25 TABLET, FILM COATED ORAL DAILY
Qty: 30 TABLET | Refills: 3 | Status: SHIPPED | OUTPATIENT
Start: 2021-08-13

## 2021-08-13 RX ORDER — SERTRALINE HYDROCHLORIDE 25 MG/1
25 TABLET, FILM COATED ORAL DAILY
COMMUNITY
End: 2021-08-13
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
## 2021-10-19 ENCOUNTER — OFFICE VISIT (OUTPATIENT)
Dept: OBGYN CLINIC | Facility: CLINIC | Age: 36
End: 2021-10-19
Payer: MEDICAID

## 2021-10-19 VITALS
BODY MASS INDEX: 27.26 KG/M2 | DIASTOLIC BLOOD PRESSURE: 78 MMHG | WEIGHT: 150 LBS | SYSTOLIC BLOOD PRESSURE: 118 MMHG | HEIGHT: 62.25 IN

## 2021-10-19 DIAGNOSIS — Z01.419 WELL FEMALE EXAM WITH ROUTINE GYNECOLOGICAL EXAM: Primary | ICD-10-CM

## 2021-10-19 DIAGNOSIS — Z12.4 SCREENING FOR MALIGNANT NEOPLASM OF CERVIX: ICD-10-CM

## 2021-10-19 PROCEDURE — 3074F SYST BP LT 130 MM HG: CPT | Performed by: OBSTETRICS & GYNECOLOGY

## 2021-10-19 PROCEDURE — 99395 PREV VISIT EST AGE 18-39: CPT | Performed by: OBSTETRICS & GYNECOLOGY

## 2021-10-19 PROCEDURE — 3078F DIAST BP <80 MM HG: CPT | Performed by: OBSTETRICS & GYNECOLOGY

## 2021-10-19 PROCEDURE — 3008F BODY MASS INDEX DOCD: CPT | Performed by: OBSTETRICS & GYNECOLOGY

## 2021-10-19 NOTE — PROGRESS NOTES
Annual  No C/O  Needs PAP  Cone 04/21, high grade LUIS 2  Menses regular  S/P tubal ligation    ROS: No Cardiac, Respiratory, GI,  or Neurological symptoms.   FH neg for breast cancer    PE:  GENERAL: well developed, well nourished, in no apparent distress

## 2022-04-15 RX ORDER — SERTRALINE HYDROCHLORIDE 25 MG/1
25 TABLET, FILM COATED ORAL DAILY
Qty: 30 TABLET | Refills: 3 | Status: SHIPPED | OUTPATIENT
Start: 2022-04-15

## 2022-04-15 NOTE — TELEPHONE ENCOUNTER
LOV 08/13/2021 w/ Dr. Lauro Boast (has NOT seen Dr. Lars Caban in office)    Last refill on 08/13/2021, for #30 tabs, with 3 refills  sertraline 25 MG Oral Tab    Future Appointments   Date Time Provider Tania Owens   4/22/2022  2:20 PM Sherman Gonzalez MD [facility]     Order(s) pending, please review. Thank you.

## 2022-04-15 NOTE — TELEPHONE ENCOUNTER
Patient stopped in to request refill  Patient is all out of med      sertraline 25 MG Oral Tab    [facility] [address] [facility]

## 2022-07-11 RX ORDER — SERTRALINE HYDROCHLORIDE 25 MG/1
TABLET, FILM COATED ORAL
Qty: 30 TABLET | Refills: 3 | OUTPATIENT
Start: 2022-07-11

## 2022-07-11 NOTE — TELEPHONE ENCOUNTER
Medication Quantity Refills Start End   sertraline 25 MG Oral Tab 30 tablet 3 4/15/2022      Refill refused    Needs appt before next refill    Patient aware    Future Appointments   Date Time Provider Tania Owens   7/28/2022 12:40 PM Sherman Lebron MD [facility] Vergara

## 2022-09-27 DIAGNOSIS — F41.9 ANXIETY: ICD-10-CM

## 2022-09-27 DIAGNOSIS — Z51.81 MEDICATION MONITORING ENCOUNTER: ICD-10-CM

## 2022-09-27 NOTE — TELEPHONE ENCOUNTER
Refill request too soon      Medication Quantity Refills Start End   sertraline (ZOLOFT) 50 MG Oral Tab 90 tablet 0 7/28/2022 10/26/2022

## 2022-12-15 RX ORDER — SERTRALINE HYDROCHLORIDE 25 MG/1
TABLET, FILM COATED ORAL
Qty: 90 TABLET | Refills: 0 | Status: SHIPPED | OUTPATIENT
Start: 2022-12-15

## 2023-01-07 DIAGNOSIS — Z51.81 MEDICATION MONITORING ENCOUNTER: ICD-10-CM

## 2023-01-07 DIAGNOSIS — F41.9 ANXIETY: ICD-10-CM

## 2023-01-07 NOTE — TELEPHONE ENCOUNTER
LRF 12/15/22 #90  LOV  7/28/22  No future appointments.         Left message to call back  Per OV in July the dose was being increased and need to clarify what dose she is taking and that she is due for follow up

## 2023-01-13 NOTE — TELEPHONE ENCOUNTER
PT CALLED TO ADV THAT SHE IS TAKING SERTRALINE 75MG DAILY.     PT ADV NEEDS REFILLS OF THE 50MG     sertraline (ZOLOFT) 50 MG Oral Tab    [facility]    Future Appointments   Date Time Provider Tania Owens   1/18/2023  3:40 PM Elizabeth Yeager MD [facility]

## 2023-01-14 RX ORDER — SERTRALINE HYDROCHLORIDE 25 MG/1
25 TABLET, FILM COATED ORAL DAILY
Qty: 90 TABLET | Refills: 0 | Status: SHIPPED | OUTPATIENT
Start: 2023-01-14 | End: 2023-04-14

## 2023-04-17 RX ORDER — SERTRALINE HYDROCHLORIDE 100 MG/1
TABLET, FILM COATED ORAL
Qty: 90 TABLET | Refills: 0 | Status: SHIPPED | OUTPATIENT
Start: 2023-04-17

## 2023-04-17 NOTE — TELEPHONE ENCOUNTER
LOV 01/20/23    Last refill on 01/20/23, for #90 tabs, with 0 refills  sertraline (ZOLOFT) 100 MG Oral Tab    No future appointments. Order(s) pending, please review. Thank you.

## 2023-10-03 DIAGNOSIS — F41.9 ANXIETY AND DEPRESSION: ICD-10-CM

## 2023-10-03 DIAGNOSIS — F32.A ANXIETY AND DEPRESSION: ICD-10-CM

## 2023-10-03 RX ORDER — SERTRALINE HYDROCHLORIDE 100 MG/1
TABLET, FILM COATED ORAL
Qty: 90 TABLET | Refills: 0 | Status: SHIPPED | OUTPATIENT
Start: 2023-10-03

## 2023-10-03 NOTE — TELEPHONE ENCOUNTER
Last OV:01/20/2023  Last refill:07/11/2023, 90 tabs, 0 refill     Medication pended, please sign if appropriate

## 2023-12-30 DIAGNOSIS — F32.A ANXIETY AND DEPRESSION: ICD-10-CM

## 2023-12-30 DIAGNOSIS — F41.9 ANXIETY AND DEPRESSION: ICD-10-CM

## 2024-01-02 NOTE — TELEPHONE ENCOUNTER
No refill protocol for this medication.    Last refill: 10/03/2023 #90 with 0 refills  Last Visit: 1/20/2023 with Dr. Cortés  Next Visit: No future appointments.      Forward to BRENDON Martin please advise on refills. Thanks.

## 2024-01-03 RX ORDER — SERTRALINE HYDROCHLORIDE 100 MG/1
TABLET, FILM COATED ORAL
Qty: 90 TABLET | Refills: 0 | Status: SHIPPED | OUTPATIENT
Start: 2024-01-03

## 2024-04-08 ENCOUNTER — TELEPHONE (OUTPATIENT)
Dept: FAMILY MEDICINE CLINIC | Facility: CLINIC | Age: 39
End: 2024-04-08

## 2024-04-08 NOTE — TELEPHONE ENCOUNTER
LM for pt to schedule physical with Dr. Reynolds or Dr. Larsen or if she has select another PCP elsewhere.

## 2024-04-16 DIAGNOSIS — F32.A ANXIETY AND DEPRESSION: ICD-10-CM

## 2024-04-16 DIAGNOSIS — F41.9 ANXIETY AND DEPRESSION: ICD-10-CM

## 2024-04-16 NOTE — TELEPHONE ENCOUNTER
Message left for patient to call back asking her to update her PCP. Please see message from 4/8/24.    Forward to Shruti, please advise on refills.

## 2024-04-17 RX ORDER — SERTRALINE HYDROCHLORIDE 100 MG/1
TABLET, FILM COATED ORAL
Qty: 90 TABLET | Refills: 0 | OUTPATIENT
Start: 2024-04-17

## 2024-04-17 NOTE — TELEPHONE ENCOUNTER
Refill denied:      Note from 1/3/24  7:34 AM:  Charis will need office visit or schedule annual wellness exam for further refills

## 2024-04-18 NOTE — TELEPHONE ENCOUNTER
Left detailed message to voicemail (per verbal release form consent with confirmed identifying message) of Shruti OLIVAS's note below. Patient was advised to call office back - needs to schedule appt for further refills and/or establish care with new PCP

## 2024-04-18 NOTE — TELEPHONE ENCOUNTER
Pt's spouse called back - on verbal consent for general info - advised spouse calling to schedule appt for pt for refills - spouse v/u and provided pt's ph#[phone], requesting to call pt    Ph#[phone] listed as pt's home phone  Left detailed message to voicemail of note below. Patient was advised to call office back - needs to schedule appt for further refills/ establish care with new PCP

## 2024-05-23 ENCOUNTER — PATIENT OUTREACH (OUTPATIENT)
Dept: FAMILY MEDICINE CLINIC | Facility: CLINIC | Age: 39
End: 2024-05-23

## 2024-07-25 ENCOUNTER — TELEPHONE (OUTPATIENT)
Dept: FAMILY MEDICINE CLINIC | Facility: CLINIC | Age: 39
End: 2024-07-25

## (undated) DEVICE — KENDALL SCD EXPRESS SLEEVES, KNEE LENGTH, MEDIUM: Brand: KENDALL SCD

## (undated) DEVICE — #11 STERILE BLADE: Brand: POLYMER COATED BLADES

## (undated) DEVICE — GYN CDS: Brand: MEDLINE INDUSTRIES, INC.

## (undated) DEVICE — VIOLET BRAIDED (POLYGLACTIN 910), SYNTHETIC ABSORBABLE SUTURE: Brand: COATED VICRYL

## (undated) DEVICE — PROCTO SWABS: Brand: DEROYAL

## (undated) DEVICE — REM POLYHESIVE ADULT PATIENT RETURN ELECTRODE: Brand: VALLEYLAB

## (undated) DEVICE — ABSORBABLE HEMOSTAT (OXIDIZED REGENERATED CELLULOSE, U.S.P.): Brand: SURGICEL

## (undated) DEVICE — SUCTION COAGULATOR: Brand: VALLEYLAB

## (undated) DEVICE — SOL  .9 1000ML BTL

## (undated) DEVICE — STERILE POLYISOPRENE POWDER-FREE SURGICAL GLOVES: Brand: PROTEXIS

## (undated) NOTE — LETTER
12/05/20          Onel Clayton           Dear Estef[facility] records indicate that you have outstanding lab work and or testing that was ordered for you and has not yet been completed:  Lab Frequency Nex

## (undated) NOTE — LETTER
[address] [address]  [phone]                  7/16/19        Jonatan Perico,  I have left a few messages for you in regards to some inconclusive test results.  Ple

## (undated) NOTE — LETTER
Haley Tapia, :1985    CONSENT FOR PROCEDURE/SEDATION    1. I authorize the performance upon Haley Tapia  the following: Colposcopy with biopsy and Endocervical curettage    2.  I authorize Dr. Radha Bragg MD (and whomever is designated as the d Relationship to patient: ____________________________________________    Witness: _________________________________________ Date:___________     Physician Signature: _______________________________ Date:___________

## (undated) NOTE — MR AVS SNAPSHOT
After Visit Summary   9/29/2020    Lisy Fernández    MRN: [id number]           Visit Information     Date & Time  9/29/2020 11:15 AM Provider  Janny Palomares MD [place] [address], [place] [facility] Dept[facility]  Phone  [phone] HPV HIGH RISK , THIN PREP COLLECTION [[id number] CUSTOM]  9/29/2020 9/29/2021    LIPID PANEL [[id number] CUSTOM]  9/29/2020 (Approximate) 9/29/2021    T PALLIDUM SCREENING CASCADE [[id number] CPT(R)]  9/29/2020 (Approximate) 9/29/2021    THINPREP PAP SMEAR B [LAB4 If you have questions, you can call [phone] to talk to our [address] staff. Remember, [employer] is NOT to be used for urgent needs. For medical emergencies, dial 911.     Sincerely,    Kayla Raza MD              [facility] now offers Video Visits through My [employer] cost  $70*       [address]   Monday – Friday  10:00 am – 10:00 pm   Saturday – Sunday  10:00 am – 4:00 pm     [email or web address] Application   Monday – Friday  4:00 pm – 10:00 pm

## (undated) NOTE — LETTER
[address] [id number]    11/19/2020      Dear  Dequan Chakraborty    In order to provide the highest quality care, [facility] [place] uses a sophisticated computer system to track our patient's